# Patient Record
Sex: MALE | Race: WHITE | Employment: FULL TIME | URBAN - METROPOLITAN AREA
[De-identification: names, ages, dates, MRNs, and addresses within clinical notes are randomized per-mention and may not be internally consistent; named-entity substitution may affect disease eponyms.]

---

## 2023-07-10 ENCOUNTER — OFFICE VISIT (OUTPATIENT)
Dept: ORTHOPEDICS CLINIC | Facility: CLINIC | Age: 63
End: 2023-07-10
Payer: COMMERCIAL

## 2023-07-10 ENCOUNTER — HOSPITAL ENCOUNTER (OUTPATIENT)
Dept: GENERAL RADIOLOGY | Age: 63
Discharge: HOME | End: 2023-07-10
Attending: ORTHOPAEDIC SURGERY
Payer: COMMERCIAL

## 2023-07-10 VITALS — WEIGHT: 279 LBS | HEIGHT: 75 IN | BODY MASS INDEX: 34.69 KG/M2

## 2023-07-10 DIAGNOSIS — Z01.89 ENCOUNTER FOR LOWER EXTREMITY COMPARISON IMAGING STUDY: ICD-10-CM

## 2023-07-10 DIAGNOSIS — M17.12 PRIMARY OSTEOARTHRITIS OF LEFT KNEE: ICD-10-CM

## 2023-07-10 DIAGNOSIS — M25.562 LEFT KNEE PAIN, UNSPECIFIED CHRONICITY: ICD-10-CM

## 2023-07-10 DIAGNOSIS — M25.462 EFFUSION OF LEFT KNEE: ICD-10-CM

## 2023-07-10 DIAGNOSIS — M25.562 LEFT KNEE PAIN, UNSPECIFIED CHRONICITY: Primary | ICD-10-CM

## 2023-07-10 DIAGNOSIS — M17.12 OSTEOARTHRITIS OF LEFT KNEE, UNSPECIFIED OSTEOARTHRITIS TYPE: ICD-10-CM

## 2023-07-10 PROCEDURE — 73562 X-RAY EXAM OF KNEE 3: CPT | Performed by: ORTHOPAEDIC SURGERY

## 2023-07-10 PROCEDURE — 73564 X-RAY EXAM KNEE 4 OR MORE: CPT | Performed by: ORTHOPAEDIC SURGERY

## 2023-07-10 RX ORDER — TRIAMCINOLONE ACETONIDE 40 MG/ML
40 INJECTION, SUSPENSION INTRA-ARTICULAR; INTRAMUSCULAR ONCE
Status: COMPLETED | OUTPATIENT
Start: 2023-07-10 | End: 2023-07-10

## 2023-07-10 RX ORDER — LISINOPRIL 10 MG/1
10 TABLET ORAL DAILY
COMMUNITY
Start: 2023-06-20

## 2023-07-10 RX ORDER — ROSUVASTATIN CALCIUM 5 MG/1
5 TABLET, COATED ORAL DAILY
COMMUNITY
Start: 2023-05-03

## 2023-07-10 RX ORDER — ASPIRIN 81 MG/1
81 TABLET ORAL DAILY
COMMUNITY

## 2023-07-10 RX ORDER — COVID-19 ANTIGEN TEST
220 KIT MISCELLANEOUS
COMMUNITY

## 2023-07-10 RX ADMIN — TRIAMCINOLONE ACETONIDE 40 MG: 40 INJECTION, SUSPENSION INTRA-ARTICULAR; INTRAMUSCULAR at 11:50:00

## 2023-08-17 ENCOUNTER — OFFICE VISIT (OUTPATIENT)
Dept: ORTHOPEDICS CLINIC | Facility: CLINIC | Age: 63
End: 2023-08-17
Payer: COMMERCIAL

## 2023-08-17 VITALS — HEIGHT: 75 IN | BODY MASS INDEX: 34.69 KG/M2 | WEIGHT: 279 LBS

## 2023-08-17 DIAGNOSIS — M11.00 CHONDROCALCINOSIS DUE TO CALCIUM HYDROXYAPATITE CRYSTALS: ICD-10-CM

## 2023-08-17 DIAGNOSIS — M25.562 ACUTE PAIN OF LEFT KNEE: ICD-10-CM

## 2023-08-17 DIAGNOSIS — M25.462 EFFUSION OF LEFT KNEE: Primary | ICD-10-CM

## 2023-08-17 PROCEDURE — 3008F BODY MASS INDEX DOCD: CPT | Performed by: ORTHOPAEDIC SURGERY

## 2023-08-17 PROCEDURE — 99214 OFFICE O/P EST MOD 30 MIN: CPT | Performed by: ORTHOPAEDIC SURGERY

## 2023-09-01 ENCOUNTER — HOSPITAL ENCOUNTER (OUTPATIENT)
Dept: MRI IMAGING | Facility: HOSPITAL | Age: 63
Discharge: HOME | End: 2023-09-01
Attending: ORTHOPAEDIC SURGERY
Payer: COMMERCIAL

## 2023-09-01 DIAGNOSIS — M11.00 CHONDROCALCINOSIS DUE TO CALCIUM HYDROXYAPATITE CRYSTALS: ICD-10-CM

## 2023-09-01 DIAGNOSIS — M25.562 ACUTE PAIN OF LEFT KNEE: ICD-10-CM

## 2023-09-01 DIAGNOSIS — M25.462 EFFUSION OF LEFT KNEE: ICD-10-CM

## 2023-09-01 PROCEDURE — 73721 MRI JNT OF LWR EXTRE W/O DYE: CPT | Performed by: ORTHOPAEDIC SURGERY

## 2023-09-07 ENCOUNTER — OFFICE VISIT (OUTPATIENT)
Dept: ORTHOPEDICS CLINIC | Facility: CLINIC | Age: 63
End: 2023-09-07
Payer: COMMERCIAL

## 2023-09-07 VITALS — BODY MASS INDEX: 34.69 KG/M2 | WEIGHT: 279 LBS | HEIGHT: 75 IN

## 2023-09-07 DIAGNOSIS — M11.20 CALCIUM PYROPHOSPHATE DEPOSITION DISEASE (CPPD): ICD-10-CM

## 2023-09-07 DIAGNOSIS — M25.462 EFFUSION OF LEFT KNEE: ICD-10-CM

## 2023-09-07 DIAGNOSIS — S83.232D COMPLEX TEAR OF MEDIAL MENISCUS OF LEFT KNEE AS CURRENT INJURY, SUBSEQUENT ENCOUNTER: Primary | ICD-10-CM

## 2023-09-07 DIAGNOSIS — M17.12 PRIMARY OSTEOARTHRITIS OF LEFT KNEE: ICD-10-CM

## 2023-09-07 PROCEDURE — 99215 OFFICE O/P EST HI 40 MIN: CPT | Performed by: ORTHOPAEDIC SURGERY

## 2023-09-07 PROCEDURE — 3008F BODY MASS INDEX DOCD: CPT | Performed by: ORTHOPAEDIC SURGERY

## 2023-09-18 ENCOUNTER — TELEPHONE (OUTPATIENT)
Dept: ORTHOPEDICS CLINIC | Facility: CLINIC | Age: 63
End: 2023-09-18

## 2023-09-18 DIAGNOSIS — M11.20 CALCIUM PYROPHOSPHATE DEPOSITION DISEASE (CPPD): ICD-10-CM

## 2023-09-18 DIAGNOSIS — M25.462 EFFUSION OF LEFT KNEE: ICD-10-CM

## 2023-09-18 DIAGNOSIS — M17.12 PRIMARY OSTEOARTHRITIS OF LEFT KNEE: Primary | ICD-10-CM

## 2023-09-19 NOTE — TELEPHONE ENCOUNTER
SURGERY SCHEDULING SHEET    Alisa Rios  6/3/1960  [id number]    Procedure: Left Knee Arthroscopy, Partial Medial Meniscectomy (45738), and Chondroplasty (32184)    Diagnosis:  Complex tear of medial meniscus of left knee as current injury, subsequent encounter S83.232D    Primary osteoarthritis of left knee M17.12    Effusion of left knee M25.462    Calcium pyrophosphate deposition disease (CPPD) M11.20    Anesthesia: General    Length of Surgery: 1 hr    Disposition: Outpatient    Special Equipment: NONE    Positioning:    Assist: Yes SERA ARAUJO    Pre-op Testing: PER ANESTHESIA GUIDELINES    Clearance: HISTORY AND PHYSICAL     Post op: 7-10 days post op    Callie Ramirez MD  [address] Orthopedic Surgery  Phone: [phone]  Fax: [phone]

## 2023-09-26 PROBLEM — D12.8 BENIGN NEOPLASM OF RECTUM AND ANAL CANAL: Status: ACTIVE | Noted: 2023-09-26

## 2023-09-26 PROBLEM — Z12.11 SPECIAL SCREENING FOR MALIGNANT NEOPLASM OF COLON: Status: ACTIVE | Noted: 2023-09-26

## 2023-09-26 PROBLEM — D12.5 BENIGN NEOPLASM OF SIGMOID COLON: Status: ACTIVE | Noted: 2023-09-26

## 2023-09-26 PROBLEM — D12.9 BENIGN NEOPLASM OF RECTUM AND ANAL CANAL: Status: ACTIVE | Noted: 2023-09-26

## 2023-10-17 ENCOUNTER — HOSPITAL ENCOUNTER (OUTPATIENT)
Facility: HOSPITAL | Age: 63
Setting detail: HOSPITAL OUTPATIENT SURGERY
Discharge: HOME | End: 2023-10-17
Attending: ORTHOPAEDIC SURGERY | Admitting: ORTHOPAEDIC SURGERY
Payer: COMMERCIAL

## 2023-10-17 ENCOUNTER — ANESTHESIA EVENT (OUTPATIENT)
Dept: SURGERY | Facility: HOSPITAL | Age: 63
End: 2023-10-17
Payer: COMMERCIAL

## 2023-10-17 ENCOUNTER — ANESTHESIA (OUTPATIENT)
Dept: SURGERY | Facility: HOSPITAL | Age: 63
End: 2023-10-17
Payer: COMMERCIAL

## 2023-10-17 VITALS
HEART RATE: 77 BPM | OXYGEN SATURATION: 95 % | WEIGHT: 275 LBS | HEIGHT: 74.75 IN | DIASTOLIC BLOOD PRESSURE: 92 MMHG | TEMPERATURE: 96 F | RESPIRATION RATE: 20 BRPM | SYSTOLIC BLOOD PRESSURE: 161 MMHG | BODY MASS INDEX: 34.55 KG/M2

## 2023-10-17 DIAGNOSIS — Z98.890 STATUS POST ARTHROSCOPY OF LEFT KNEE: ICD-10-CM

## 2023-10-17 DIAGNOSIS — Z01.818 PRE-OP TESTING: Primary | ICD-10-CM

## 2023-10-17 PROCEDURE — 0SBD4ZZ EXCISION OF LEFT KNEE JOINT, PERCUTANEOUS ENDOSCOPIC APPROACH: ICD-10-PCS | Performed by: ORTHOPAEDIC SURGERY

## 2023-10-17 RX ORDER — SODIUM CHLORIDE, SODIUM LACTATE, POTASSIUM CHLORIDE, CALCIUM CHLORIDE 600; 310; 30; 20 MG/100ML; MG/100ML; MG/100ML; MG/100ML
INJECTION, SOLUTION INTRAVENOUS CONTINUOUS
Status: DISCONTINUED | OUTPATIENT
Start: 2023-10-17 | End: 2023-10-17

## 2023-10-17 RX ORDER — MEPERIDINE HYDROCHLORIDE 25 MG/ML
12.5 INJECTION INTRAMUSCULAR; INTRAVENOUS; SUBCUTANEOUS AS NEEDED
Status: DISCONTINUED | OUTPATIENT
Start: 2023-10-17 | End: 2023-10-17

## 2023-10-17 RX ORDER — ACETAMINOPHEN 500 MG
1000 TABLET ORAL ONCE AS NEEDED
Status: COMPLETED | OUTPATIENT
Start: 2023-10-17 | End: 2023-10-17

## 2023-10-17 RX ORDER — MIDAZOLAM HYDROCHLORIDE 1 MG/ML
1 INJECTION INTRAMUSCULAR; INTRAVENOUS EVERY 5 MIN PRN
Status: DISCONTINUED | OUTPATIENT
Start: 2023-10-17 | End: 2023-10-17

## 2023-10-17 RX ORDER — ACETAMINOPHEN AND CODEINE PHOSPHATE 300; 30 MG/1; MG/1
1 TABLET ORAL EVERY 6 HOURS PRN
Qty: 20 TABLET | Refills: 0 | Status: SHIPPED | OUTPATIENT
Start: 2023-10-17

## 2023-10-17 RX ORDER — HYDROCODONE BITARTRATE AND ACETAMINOPHEN 5; 325 MG/1; MG/1
2 TABLET ORAL ONCE AS NEEDED
Status: COMPLETED | OUTPATIENT
Start: 2023-10-17 | End: 2023-10-17

## 2023-10-17 RX ORDER — NALOXONE HYDROCHLORIDE 0.4 MG/ML
0.08 INJECTION, SOLUTION INTRAMUSCULAR; INTRAVENOUS; SUBCUTANEOUS AS NEEDED
Status: DISCONTINUED | OUTPATIENT
Start: 2023-10-17 | End: 2023-10-17

## 2023-10-17 RX ORDER — PROCHLORPERAZINE EDISYLATE 5 MG/ML
5 INJECTION INTRAMUSCULAR; INTRAVENOUS EVERY 8 HOURS PRN
Status: DISCONTINUED | OUTPATIENT
Start: 2023-10-17 | End: 2023-10-17

## 2023-10-17 RX ORDER — LIDOCAINE HYDROCHLORIDE 10 MG/ML
INJECTION, SOLUTION INFILTRATION; PERINEURAL AS NEEDED
Status: DISCONTINUED | OUTPATIENT
Start: 2023-10-17 | End: 2023-10-17 | Stop reason: HOSPADM

## 2023-10-17 RX ORDER — BUPIVACAINE HYDROCHLORIDE AND EPINEPHRINE 2.5; 5 MG/ML; UG/ML
INJECTION, SOLUTION EPIDURAL; INFILTRATION; INTRACAUDAL; PERINEURAL AS NEEDED
Status: DISCONTINUED | OUTPATIENT
Start: 2023-10-17 | End: 2023-10-17 | Stop reason: HOSPADM

## 2023-10-17 RX ORDER — CEFAZOLIN SODIUM IN 0.9 % NACL 3 G/100 ML
INTRAVENOUS SOLUTION, PIGGYBACK (ML) INTRAVENOUS
Status: DISCONTINUED
Start: 2023-10-17 | End: 2023-10-17

## 2023-10-17 RX ORDER — ONDANSETRON 2 MG/ML
4 INJECTION INTRAMUSCULAR; INTRAVENOUS EVERY 6 HOURS PRN
Status: DISCONTINUED | OUTPATIENT
Start: 2023-10-17 | End: 2023-10-17

## 2023-10-17 RX ORDER — DEXAMETHASONE SODIUM PHOSPHATE 4 MG/ML
VIAL (ML) INJECTION AS NEEDED
Status: DISCONTINUED | OUTPATIENT
Start: 2023-10-17 | End: 2023-10-17 | Stop reason: SURG

## 2023-10-17 RX ORDER — DIPHENHYDRAMINE HYDROCHLORIDE 50 MG/ML
12.5 INJECTION INTRAMUSCULAR; INTRAVENOUS AS NEEDED
Status: DISCONTINUED | OUTPATIENT
Start: 2023-10-17 | End: 2023-10-17

## 2023-10-17 RX ORDER — HYDROCODONE BITARTRATE AND ACETAMINOPHEN 5; 325 MG/1; MG/1
1 TABLET ORAL ONCE AS NEEDED
Status: COMPLETED | OUTPATIENT
Start: 2023-10-17 | End: 2023-10-17

## 2023-10-17 RX ORDER — HYDROMORPHONE HYDROCHLORIDE 1 MG/ML
0.4 INJECTION, SOLUTION INTRAMUSCULAR; INTRAVENOUS; SUBCUTANEOUS EVERY 5 MIN PRN
Status: DISCONTINUED | OUTPATIENT
Start: 2023-10-17 | End: 2023-10-17

## 2023-10-17 RX ORDER — EPHEDRINE SULFATE 50 MG/ML
INJECTION INTRAVENOUS AS NEEDED
Status: DISCONTINUED | OUTPATIENT
Start: 2023-10-17 | End: 2023-10-17 | Stop reason: SURG

## 2023-10-17 RX ORDER — MORPHINE SULFATE 2 MG/ML
INJECTION, SOLUTION INTRAMUSCULAR; INTRAVENOUS AS NEEDED
Status: DISCONTINUED | OUTPATIENT
Start: 2023-10-17 | End: 2023-10-17 | Stop reason: HOSPADM

## 2023-10-17 RX ORDER — CEFAZOLIN SODIUM IN 0.9 % NACL 3 G/100 ML
3 INTRAVENOUS SOLUTION, PIGGYBACK (ML) INTRAVENOUS ONCE
Status: COMPLETED | OUTPATIENT
Start: 2023-10-17 | End: 2023-10-17

## 2023-10-17 RX ORDER — SCOLOPAMINE TRANSDERMAL SYSTEM 1 MG/1
1 PATCH, EXTENDED RELEASE TRANSDERMAL ONCE
Status: DISCONTINUED | OUTPATIENT
Start: 2023-10-17 | End: 2023-10-17 | Stop reason: HOSPADM

## 2023-10-17 RX ORDER — MIDAZOLAM HYDROCHLORIDE 1 MG/ML
INJECTION INTRAMUSCULAR; INTRAVENOUS AS NEEDED
Status: DISCONTINUED | OUTPATIENT
Start: 2023-10-17 | End: 2023-10-17 | Stop reason: SURG

## 2023-10-17 RX ORDER — LIDOCAINE HYDROCHLORIDE 10 MG/ML
INJECTION, SOLUTION EPIDURAL; INFILTRATION; INTRACAUDAL; PERINEURAL AS NEEDED
Status: DISCONTINUED | OUTPATIENT
Start: 2023-10-17 | End: 2023-10-17 | Stop reason: SURG

## 2023-10-17 RX ORDER — HYDROMORPHONE HYDROCHLORIDE 1 MG/ML
0.6 INJECTION, SOLUTION INTRAMUSCULAR; INTRAVENOUS; SUBCUTANEOUS EVERY 5 MIN PRN
Status: DISCONTINUED | OUTPATIENT
Start: 2023-10-17 | End: 2023-10-17

## 2023-10-17 RX ORDER — HYDROMORPHONE HYDROCHLORIDE 1 MG/ML
0.2 INJECTION, SOLUTION INTRAMUSCULAR; INTRAVENOUS; SUBCUTANEOUS EVERY 5 MIN PRN
Status: DISCONTINUED | OUTPATIENT
Start: 2023-10-17 | End: 2023-10-17

## 2023-10-17 RX ORDER — ONDANSETRON 2 MG/ML
INJECTION INTRAMUSCULAR; INTRAVENOUS AS NEEDED
Status: DISCONTINUED | OUTPATIENT
Start: 2023-10-17 | End: 2023-10-17 | Stop reason: SURG

## 2023-10-17 RX ORDER — ACETAMINOPHEN 500 MG
1000 TABLET ORAL ONCE
Status: DISCONTINUED | OUTPATIENT
Start: 2023-10-17 | End: 2023-10-17 | Stop reason: HOSPADM

## 2023-10-17 RX ADMIN — SODIUM CHLORIDE, SODIUM LACTATE, POTASSIUM CHLORIDE, CALCIUM CHLORIDE: 600; 310; 30; 20 INJECTION, SOLUTION INTRAVENOUS at 14:05:00

## 2023-10-17 RX ADMIN — CEFAZOLIN SODIUM IN 0.9 % NACL 3 G: 3 G/100 ML INTRAVENOUS SOLUTION, PIGGYBACK (ML) INTRAVENOUS at 13:05:00

## 2023-10-17 RX ADMIN — MIDAZOLAM HYDROCHLORIDE 2 MG: 1 INJECTION INTRAMUSCULAR; INTRAVENOUS at 12:59:00

## 2023-10-17 RX ADMIN — EPHEDRINE SULFATE 5 MG: 50 INJECTION INTRAVENOUS at 13:23:00

## 2023-10-17 RX ADMIN — SODIUM CHLORIDE, SODIUM LACTATE, POTASSIUM CHLORIDE, CALCIUM CHLORIDE: 600; 310; 30; 20 INJECTION, SOLUTION INTRAVENOUS at 12:59:00

## 2023-10-17 RX ADMIN — DEXAMETHASONE SODIUM PHOSPHATE 8 MG: 4 MG/ML VIAL (ML) INJECTION at 13:11:00

## 2023-10-17 RX ADMIN — LIDOCAINE HYDROCHLORIDE 50 MG: 10 INJECTION, SOLUTION EPIDURAL; INFILTRATION; INTRACAUDAL; PERINEURAL at 13:03:00

## 2023-10-17 RX ADMIN — ONDANSETRON 4 MG: 2 INJECTION INTRAMUSCULAR; INTRAVENOUS at 13:54:00

## 2023-10-17 NOTE — OPERATIVE REPORT
[address] OPERATIVE NOTE    PATIENT'S NAME: Nader Simms   ATTENDING PHYSICIAN: Radha Howard MD   OPERATING PHYSICIAN: John Rosario MD   [facility]#:   [id number]  MEDICAL RECORD #:   [id number]    YOB: 1960  ADMISSION DATE:       10/17/2023     OPERATION DATE:  10/17/2023    Preoperative Diagnosis: Primary osteoarthritis of left knee [M17.12]  Effusion of left knee [M25.462]  Calcium pyrophosphate deposition disease (CPPD) [M11.20]  Medial meniscus tear    Postoperative Diagnosis: Primary osteoarthritis of left knee [M17.12]  Effusion of left knee [M25.462]  Calcium pyrophosphate deposition disease (CPPD) [M11.20]  Medial meniscus tear, complex  Lateral meniscus tear, complex  Synovitis    Procedures Performed: LEFT KNEE ARTHROSCOPY, PARTIAL MEDIAL MENISCECTOMY, PARTIAL LATERAL MENISCECTOMY, PARTIAL SYNOVECTOMY, AND CHONDROPLASTY. Primary Surgeon: John Rosario MD     Assistant: PA: Scott Cristobal PA-C; Nohemi Pettit [employer] student    Surgical Findings: See post-op    Anesthesia: General     Complications: none    Specimen: none    Drains: none    Condition: stable    Estimated Blood Loss: 10 ml    Tourniquet Time:  less than one hour     INDICATIONS:  The patient is a 58year old male who was seen in our office for persistent and progressive left knee pain and swelling. The patient describes no injury, gradual onset and progressive swelling. Plain films demonstrated no late degenerative changes. Symptoms did not improve despite initial conservative care. MRI was therefore obtained, which identified a complex medial meniscus tear. Treatment options were reviewed including continued non-operative care versus arthroscopic surgery. Given continued poor function and pain with activities, the patient elected to proceed with arthroscopy.   Risk, benefits and alternatives to surgery were discussed including but not limited to possible infection, bleeding, neurovascular injury as well as postop stiffness, continued pain and failed improvement following surgery. Risks of anesthesia were also reviewed including but not limited to possible cardiac, pulmonary, or cerebrovascular complications, any of which could be serious or life-threatening. Given the patients good health, the risk of the serious complications is felt to be low but not zero. The patient expressed understanding and elected to proceed. Pre-operative medical clearance for anesthesia was obtained through the patient's primary care physicians office, and informed consent was obtained. OPERATIVE TECHNIQUE:  The patient was identified in the preop holding area where the left lower extremity was initialed and informed consent was confirmed. The patient was taken to the operating room and placed supine on the operating table by anesthesia. After successful induction of general anesthesia, the patient was placed in a well-padded proximal thigh tourniquet pre-set to 250 mmHg. The operative leg was stabilized in an Acufex leg naik. The contralateral leg was placed in a sequential compression device at the calf and stabilized in a lower leg cradle in the felipa-lithotomy position with foam padding. The surgical leg was then prepped and draped in the usual sterile fashion. After confirming consent, side, and prophylactic antibiotics with an appropriate time-out, the operative lower extremity was exsanguinated with an Esmarch and the tourniquet was elevated. Standard diagnostic arthroscopy was initiated through a horizontal stab incision at the anterolateral portal site. Semi-blunt entry into the patellofemoral pouch revealed global grade 3 chondromalacia at the patella. The trochlea demonstrated similar global chondromalacia. Arthroscope was swept into the medial gutter where no loose bodies or osteophytes were seen. The medial compartment was inspected, and there was complex tearing of the medial meniscus.   A spinal needle was used to localize the medial working portal, followed by probing which confirmed the meniscus tear, global grade 3 chondromalacia and chondrocalcinosis scattered within the torn meniscal tissue and cartilage. Partial medial meniscectomy was therefore carried out along with gentle chondroplasty of the medial femoral condyle and tibial plateau. This included the combined use of duckbill biting instruments, followed by a 4-0 aggressive synovial resector until a stable margin was achieved at the midbody and posterior horn. Approximately 40% of the midbody and 70% of the posterior horn required resection until stable margins were achieved. Anterior horn was intact. Inspection of the notch revealed a normal-appearing and palpably intact ACL and PCL with no evidence of tear. Lateral compartment was meticulously probed which revealed inner margin tearing of lateral meniscus. Articular cartilage of the lateral femoral condyle and tibial plateau revealed mild grade 2 wear. Partial lateral meniscectomy was therefore carried out again using a combination of the 4-0 aggressive resector and duckbill biting instruments until a stable margin was achieved. Approximately 20% of the body and 30% of the posterior horn required resection until stable margins were achieved. A small flap was also removed anteriorly with associated chondrocalcinosis deposits. Chondroplasty of the distal femur and lateral tibial plateau was performed as needed with a shaver to a stable surface. Lateral gutter was free of loose bodies. At this point, the arthroscope was redirected into the patellofemoral joint. Meticulous probing confirmed the chondromalacia noted above. The medial and lateral portals were used to perform a chondroplasty using a 4.0 shaver at the uneven surfaces of the patella and the trochlea until stable margins were achieved.   Partial synovectomy was also performed at the pouch and gutters where hypertrophic synovial inflammation was seen. All 3 compartments were copiously irrigated and evacuated of arthroscopic debris. An equal mixture of 1% Xylocaine and 0.25% Marcaine was mixed with 2 mg of Duramorph totalling about 30 ml, and this mixture was infused into the knee for postop pain control. Portals were reapproximated with 3-0 nylon, and a sterile nonadhesive dressing was applied using Betadine ointment, Adaptic, fluffs, ABDs and sterile Webril. A compressive 6-inch Ace was applied. Tourniquet was released at less than one hour elapsed time. The patient was awoken without complication and taken to the postanesthesia recovery room in stable condition. All sponge, needle, and instrument counts were correct. Dedicated assistance from [employer], was necessary for setup, leg and shaver management for joint access, wound closure and dressing application. Erica Carr MD, [place] [address] Medicine/Knee and Shoulder  [facility] Department of Orthopaedics  Phone [phone]  Fax [phone]  2:11 PM  10/17/23

## 2023-10-17 NOTE — ANESTHESIA PROCEDURE NOTES
Airway  Date/Time: 10/17/2023 1:04 PM  Urgency: elective    Airway not difficult    General Information and Staff    Patient location during procedure: OR  Anesthesiologist: Roque Shell DO  Resident/CRNA: Lilo Delcid CRNA  Performed: CRNA   Performed by: Lilo Delcid CRNA  Authorized by: Roque Shell DO      Indications and Patient Condition  Indications for airway management: anesthesia  Sedation level: deep  Preoxygenated: yes  Patient position: sniffing  Mask difficulty assessment: 1 - vent by mask    Final Airway Details  Final airway type: supraglottic airway      Successful airway: classic  Size 4       Number of attempts at approach: 1

## 2023-10-17 NOTE — H&P
Orthopaedic H&P Update    H&P performed by Alissa Rodríguez PA-C on 10/2/2023 was reviewed by Pratima Urbina MD on 10/17/2023, the patient was examined and no significant changes have occurred in the patient's condition since the H&P was performed. Risks and benefits were discussed, proceed with procedure as planned.       Pratima Urbina MD

## 2023-10-17 NOTE — DISCHARGE INSTRUCTIONS
Post-operative Knee Arthroscopy    Medication: Before you leave the hospital, you will be given a prescription for a pain reliever. This medication contains a narcotic with acetaminophen (Tylenol), so do not take any additional Tylenol. You may take Tylenol or Ibuprofen instead of the prescription as the pain subsides. If you take a daily Aspirin you may resume taking your Aspirin the evening of surgery. Day of Surgery: When you return home, elevate the extremity on some pillows, if applicable. Place an ice bag over the dressing for about 20 minutes three or four times a day for the first 5 days while protecting the skin with a sterile dressing plus Ace wrap (for knee). Dressings: The dressing should remain in place for 48 hours. After 48 hours, remove the bulky Ace wrap and the gauze dressings. Apply a small amount of Betadine ointment to sutures and cover with a breathable band-aid. Showering: Before showering, apply waterproof band-aids to sutures to ensure these areas do not get wet. Once finished, remove band-aids, let area air dry, and apply another small amount of Betadine ointment and regular band-aids. Do not immerse or soak the surgical wound (no baths). You may continue to use the Ace bandage if it makes you more comfortable. Activity: You may walk on the leg as tolerated, unless instructed differently. You may use crutches or a cane as needed to minimize discomfort. For knee arthroscopy: Flex and extend the knee, foot, and ankle to full range of motion as soon as possible to prevent stiffness. Follow up: You will be scheduled for a follow up office visit in 7-10 days to have your sutures taken out. Your plan for physical therapy, driving, and returning to work will be discussed at this appointment. Please don't hesitate to contact our office at [phone] if you have any post-operative questions or concerns.      Remember Geovanna's Top 3  Deep Breathing  Walking and Foot Pumps  Plenty of Water    It was a pleasure taking care of you.   Take Care  Geovanna RN

## 2023-10-25 ENCOUNTER — OFFICE VISIT (OUTPATIENT)
Dept: ORTHOPEDICS CLINIC | Facility: CLINIC | Age: 63
End: 2023-10-25

## 2023-10-25 VITALS — HEIGHT: 74.75 IN | BODY MASS INDEX: 34.55 KG/M2 | WEIGHT: 275 LBS

## 2023-10-25 DIAGNOSIS — Z98.890 STATUS POST ARTHROSCOPY OF LEFT KNEE: ICD-10-CM

## 2023-10-25 DIAGNOSIS — Z48.89 AFTERCARE FOLLOWING SURGERY: Primary | ICD-10-CM

## 2023-10-25 PROCEDURE — 99024 POSTOP FOLLOW-UP VISIT: CPT | Performed by: PHYSICIAN ASSISTANT

## 2023-10-25 PROCEDURE — 3008F BODY MASS INDEX DOCD: CPT | Performed by: PHYSICIAN ASSISTANT

## 2023-10-25 NOTE — PROGRESS NOTES
[facility] Post Op Progress Note    Date of Surgery: 10/17/2023      Subjective: Margret Barrwo is a 58year old male who is here for follow-up of his left knee. He underwent left knee arthroscopy with partial medial and lateral meniscectomy by Dr. Yomi Tompkins approximately 1 week ago. He states that he is doing well but has some persistent swelling. He has been using a crutch for ambulatory assistance. He has been working on range of motion exercises on his own at home. Objective: Exam of the left knee and lower extremity reveals the portals are clean, dry, and intact. Sutures were removed and Band-Aids were applied. He has full extension and flexion to 105 degrees. He does have moderate effusion. No calf tenderness upon palpation. Negative Homans' sign. Sensation is present to light touch. Assessment: Status post left knee arthroscopy with partial medial and lateral meniscectomy with partial synovectomy and chondroplasty      Plan: Arthroscopic pictures and surgical findings were discussed with the patient. He did have medial and lateral meniscus tear that was trimmed to stable edges as well as synovitis and chondrocalcinosis that was debrided and evacuated. Postop instructions were discussed including range of motion exercises. I did offer formal therapy but he declined for now and will like to try it on his own at home. He will continue to apply ice and elevate the leg with his persistent swelling. He will slowly advance with activities as tolerated. Wound care was discussed and he will avoid soaking or submersion of the wounds for an additional 2 to 3 weeks. I would like to see him back in 3 weeks for recheck or sooner with questions, concerns, or worsening symptoms. A student was present during the visit after the patient's consent.       Fran Christian, PEDRITO, PA-C Orthopedic Surgery   [facility]  [address]   t: [phone]  f: [phone]

## 2023-11-15 ENCOUNTER — OFFICE VISIT (OUTPATIENT)
Dept: ORTHOPEDICS CLINIC | Facility: CLINIC | Age: 63
End: 2023-11-15
Payer: COMMERCIAL

## 2023-11-15 VITALS — BODY MASS INDEX: 35.29 KG/M2 | HEIGHT: 74 IN | WEIGHT: 275 LBS

## 2023-11-15 DIAGNOSIS — M25.462 EFFUSION, LEFT KNEE: ICD-10-CM

## 2023-11-15 DIAGNOSIS — Z48.89 AFTERCARE FOLLOWING SURGERY: Primary | ICD-10-CM

## 2023-11-15 DIAGNOSIS — Z98.890 STATUS POST ARTHROSCOPY OF LEFT KNEE: ICD-10-CM

## 2023-11-15 PROCEDURE — 3008F BODY MASS INDEX DOCD: CPT | Performed by: PHYSICIAN ASSISTANT

## 2023-11-15 PROCEDURE — 99024 POSTOP FOLLOW-UP VISIT: CPT | Performed by: PHYSICIAN ASSISTANT

## 2023-11-15 PROCEDURE — 20610 DRAIN/INJ JOINT/BURSA W/O US: CPT | Performed by: PHYSICIAN ASSISTANT

## 2023-11-15 RX ORDER — TRIAMCINOLONE ACETONIDE 40 MG/ML
40 INJECTION, SUSPENSION INTRA-ARTICULAR; INTRAMUSCULAR ONCE
Status: COMPLETED | OUTPATIENT
Start: 2023-11-15 | End: 2023-11-15

## 2023-11-15 RX ADMIN — TRIAMCINOLONE ACETONIDE 40 MG: 40 INJECTION, SUSPENSION INTRA-ARTICULAR; INTRAMUSCULAR at 09:00:00

## 2023-11-15 NOTE — PROGRESS NOTES
[facility] Post Op Progress Note    Date of Surgery: 10/17/2023      Subjective: Ok Thornton is a 58year old male who is here for reevaluation of his left knee. He underwent left knee arthroscopy with partial medial lateral meniscectomy by Dr. Kimi Talavera approximately 1 month ago. He has noticed some persistent swelling and discomfort in the knee. He does get some sharp pains intermittently which do cause instability. He has been stretching on his own and limiting activity. He is here for further evaluation. Objective: Exam of the left knee and lower extremity reveals that the portals are well-healed. He lacks 2 to 3 degrees of full extension and flexion to 120 degrees. He does have a moderate to significant effusion. Sensation is present to light touch. Assessment: Left knee effusion status post arthroscopy with partial medial and lateral meniscectomy with partial synovectomy and chondroplasty      Plan: Arthroscopic pictures were discussed again. He did have areas of chondromalacia as well as synovitis that may be causing his persistent effusion. At this time, I did offer him an aspiration and cortisone injection to help further his progress postoperatively. He would like to proceed. He will continue stretching on his own. If in a week he is not regaining full motion after the aspiration, he will contact us for initiation of formal therapy. He will continue to limit activity and just do gentle stretching. He will follow-up with me in 1 month for recheck or sooner with questions, concerns, or worsening symptoms. Procedure: Risk, benefits, and alternatives to the aspiration and cortisone injection including but not limited to risk of needle infection, flareup, and failure to improve was discussed. He would like to proceed under meticulous sterile technique, 4 cc Xylocaine was used to anesthetize the lateral patellofemoral joint of the left knee.   Then through an 18-gauge needle, 70 cc of deann serosanguineous fluid was aspirated. Through the same 18-gauge needle, 4 cc of Xylocaine and 40 mg of Kenalog was injected with free flow fluid. A Band-Aid was applied. An Ace wrap wrap was also applied. Advised to follow-up with any adverse reactions.         PEDRITO Woodson, PA-C Orthopedic Surgery   [facility]opaedic Surgery  [address]   t: [phone]  f: [phone]

## 2023-11-29 ENCOUNTER — TELEPHONE (OUTPATIENT)
Dept: ORTHOPEDICS CLINIC | Facility: CLINIC | Age: 63
End: 2023-11-29

## 2023-11-29 DIAGNOSIS — Z48.89 AFTERCARE FOLLOWING SURGERY: Primary | ICD-10-CM

## 2023-11-29 DIAGNOSIS — Z98.890 STATUS POST ARTHROSCOPY OF LEFT KNEE: ICD-10-CM

## 2023-11-29 NOTE — TELEPHONE ENCOUNTER
- I just want to make sure I am not doing anything to hurt it. - Patient has been sick since thanksgiving and \"everything has stiffened up\". - swelling noted to the backside of the knee (not as swollen at the LOV)  - Tightness to back of the knee. I cannot bend it as good as I could a week ago    Patient states he would rather hold off on official therapy as he states he schedule is crazy.  \" I have got to go to work or I wont have a job\"    11/15/23:  \"If in a week he is not regaining full motion after the aspiration, he will contact us for initiation of formal therapy\"    PT pending

## 2023-11-29 NOTE — TELEPHONE ENCOUNTER
Patient called stating Jessica Mai had told him to give us a call if he was not doing better. Patient stated he is not doing better. Please advise     11/15/23:   If in a week he is not regaining full motion after the aspiration, he will contact us for initiation of formal therapy

## 2023-12-20 ENCOUNTER — OFFICE VISIT (OUTPATIENT)
Dept: ORTHOPEDICS CLINIC | Facility: CLINIC | Age: 63
End: 2023-12-20
Payer: COMMERCIAL

## 2023-12-20 VITALS — BODY MASS INDEX: 35.29 KG/M2 | HEIGHT: 74 IN | WEIGHT: 275 LBS

## 2023-12-20 DIAGNOSIS — Z98.890 STATUS POST ARTHROSCOPY OF LEFT KNEE: Primary | ICD-10-CM

## 2023-12-20 DIAGNOSIS — M17.12 PRIMARY OSTEOARTHRITIS OF LEFT KNEE: ICD-10-CM

## 2023-12-20 DIAGNOSIS — M25.462 EFFUSION, LEFT KNEE: ICD-10-CM

## 2023-12-20 PROCEDURE — 3008F BODY MASS INDEX DOCD: CPT | Performed by: PHYSICIAN ASSISTANT

## 2023-12-20 PROCEDURE — 20610 DRAIN/INJ JOINT/BURSA W/O US: CPT | Performed by: PHYSICIAN ASSISTANT

## 2023-12-20 PROCEDURE — 99024 POSTOP FOLLOW-UP VISIT: CPT | Performed by: PHYSICIAN ASSISTANT

## 2023-12-20 NOTE — PROGRESS NOTES
[facility] Post Op Progress Note    Date of Surgery: 10/17/2023       Subjective: Lazaro Ahumada is a 58year old male who is here for follow-up of his left knee. He underwent left knee arthroscopy with partial medial and lateral meniscectomy by Dr. Swapna Powell approximately 2 months ago. I did see him last month at which time an aspiration and cortisone injection was administered to the knee. He states that this provided relief but swelling has recurred. Overall his range of motion is much better and feels like it is normalizing. He does note episodes of instability where the knee carissa and has pain anteriorly. He has been icing multiple times a day, elevation and using a compressive brace. Objective: Exam of the left knee and lower extremity reveals that the portals are well-healed. He has near full extension and flexion to 130 degrees. He does have a moderate effusion. Sensation is present to light touch. No significant medial or lateral joint line tenderness. Sensation is present to light touch. Assessment: Recurrent effusion status post left knee arthroscopy with partial medial and lateral meniscectomy and chondroplasty      Plan: Unfortunately he does have a recurrence of his effusion despite normalizing range of motion. I explained that this can cause a buckling sensation and I recommend going ahead with an aspiration today. He will continue with his diligent routine of icing, elevation, compression and will try oral anti-inflammatories if swelling starts to recur. He would like to proceed with the aspiration today. He will follow-up with Dr. Swapna Powell in 1 month with persistent pain or recurrence of symptoms. I did explain that he did have mild degenerative changes and chondromalacia that may be causing persistent swelling especially if he is doing too much activity. He understands and will follow-up as mentioned.     Procedure: Risk, benefits, and alternatives to the aspiration including but not limited to risk of needle infection, flareup, and failure to improve was discussed. He would like to proceed under meticulous sterile technique, 4 cc Xylocaine was used to anesthetize the lateral patellofemoral joint of the left knee. Then through an 18-gauge needle, 45cc of deann serosanguineous fluid was aspirated. A Band-Aid was applied and he will apply his compressive brace at home. He will follow-up with any adverse reactions or worsening symptoms in the interim.       PEDRITO Ge, PA-C Orthopedic Surgery   [facility]opaedic Surgery  [address]   t: [phone]  f: [phone]

## 2024-01-24 ENCOUNTER — OFFICE VISIT (OUTPATIENT)
Dept: ORTHOPEDICS CLINIC | Facility: CLINIC | Age: 64
End: 2024-01-24
Payer: COMMERCIAL

## 2024-01-24 VITALS — HEIGHT: 74 IN | BODY MASS INDEX: 35.29 KG/M2 | WEIGHT: 275 LBS

## 2024-01-24 DIAGNOSIS — Z98.890 STATUS POST ARTHROSCOPY OF LEFT KNEE: ICD-10-CM

## 2024-01-24 DIAGNOSIS — M25.462 EFFUSION OF LEFT KNEE: Primary | ICD-10-CM

## 2024-01-24 DIAGNOSIS — M17.12 PRIMARY OSTEOARTHRITIS OF LEFT KNEE: ICD-10-CM

## 2024-01-24 PROCEDURE — 3008F BODY MASS INDEX DOCD: CPT | Performed by: ORTHOPAEDIC SURGERY

## 2024-01-24 PROCEDURE — 99213 OFFICE O/P EST LOW 20 MIN: CPT | Performed by: ORTHOPAEDIC SURGERY

## 2024-01-24 NOTE — PROGRESS NOTES
[facility] Note    Chief Complaint   Patient presents with    Follow - Up     LT KNEE SCOPE PMM; SX: 10/17/23     HPI: The patient is a 63 year old male who presents about 3 months s/p arthroscopy to the left knee.  Although he is improved with regard to the medial aspect of this knee, there is still stiffness, swelling and intermittent discomfort anteriorly.  Significant patellofemoral arthritic changes were seen at the time of surgery and he has responded to some degree to postoperative aspiration of his knee effusions.  The most recent aspiration was provided about a month ago by Rebecca.    Past Medical History:   Diagnosis Date    Atypical mole     Calculus of kidney     Hearing loss     Hemorrhoids     High blood pressure     KIDNEY STONE     Visual impairment     Wears glasses      Past Surgical History:   Procedure Laterality Date    FRAGMENTING OF KIDNEY STONE  01/05/2010    Performed by LEXI MONTANEZ at [facility] [place]    OTHER SURGICAL HISTORY  01/05/2010    R [facility] Cysto- Dr. Montanez    OTHER SURGICAL HISTORY Left 10/17/2023    Left knee scope     Current Outpatient Medications   Medication Sig Dispense Refill    Multiple Vitamin (MULTI-VITAMIN DAILY) Oral Tab Take by mouth.      EQL GLUCOSAMINE CHONDROITIN OR Take 1,200 mg by mouth daily.      lisinopril 10 MG Oral Tab Take 1 tablet (10 mg total) by mouth daily.      rosuvastatin 5 MG Oral Tab Take 1 tablet (5 mg total) by mouth daily.      aspirin 81 MG Oral Tab EC Take 1 tablet (81 mg total) by mouth daily.      NORVASC 10 MG OR TABS Take by mouth.       No Known Allergies  Family History   Problem Relation Age of Onset    Hypertension Mother      Social History     Occupational History    Not on file   Tobacco Use    Smoking status: Never    Smokeless tobacco: Never   Vaping Use    Vaping Use: Never used   Substance and Sexual Activity    Alcohol use: Yes     Alcohol/week: 4.0 standard drinks of alcohol     Types: 2 Glasses of wine, 2  Standard drinks or equivalent per week    Drug use: Never    Sexual activity: Not on file        ROS:  Complete ROS reviewed by me and non-contributory to the chief complaint except as mentioned above.    Physical Exam:    Ht 6' 2\" (1.88 m)   Wt 275 lb (124.7 kg)   BMI 35.31 kg/m²   Constitutional: Well developed, well nourished 63 year old male  Psychological: NAD, alert and appropriate  Respiratory: Breathing comfortably on room air with RR of 10-14  Cardiac: Palpable distal pulses with pink warm extremities  Left knee reveals well-healed portals with 1+ effusion.  He can fully extend and flex to 120 degrees.  Medial joint line is trace tender more so than lateral with a negative Joyce's.  Popliteal space is benign.  Neurovascular status is intact on sensory, motor and perfusion assessment distally.    Assessment/Diagnoses:  Diagnoses and all orders for this visit:    Effusion of left knee    Status post arthroscopy of left knee    Primary osteoarthritis of left knee      Plan:  I reviewed imaging and exam findings with the patient.  Most of his symptoms are localized to the anterior aspect of this left knee at the patellofemoral articulation with fairly high-grade arthritic changes were noted at arthroscopy.  He continues to struggle with mild stiffness from effusions as well.  We had a long discussion regarding the diagnosis, treatment options and prognosis.  In light of his multiple aspirations following surgery I would recommend we hold off on any needle procedures for today.  Weight loss, activity protection, light compression and icing will continue to be beneficial.  Oral and topical modalities to reduce inflammation were also reviewed and recommended.  If symptoms are not gradually improving with continued conservative care reassessment is advised.  Questions were answered and he verbalized understanding and appreciation.      Erica Coto MD  [facility] [facility]      This document was  partially prepared using Dragon [employer] voice recognition software.

## 2024-03-13 ENCOUNTER — OFFICE VISIT (OUTPATIENT)
Dept: ORTHOPEDICS CLINIC | Facility: CLINIC | Age: 64
End: 2024-03-13
Payer: COMMERCIAL

## 2024-03-13 VITALS — HEIGHT: 74 IN | WEIGHT: 275 LBS | BODY MASS INDEX: 35.29 KG/M2

## 2024-03-13 DIAGNOSIS — Z98.890 STATUS POST ARTHROSCOPY OF LEFT KNEE: ICD-10-CM

## 2024-03-13 DIAGNOSIS — M25.462 EFFUSION OF LEFT KNEE: Primary | ICD-10-CM

## 2024-03-13 DIAGNOSIS — M17.12 PRIMARY OSTEOARTHRITIS OF LEFT KNEE: ICD-10-CM

## 2024-03-13 PROCEDURE — 99214 OFFICE O/P EST MOD 30 MIN: CPT | Performed by: PHYSICIAN ASSISTANT

## 2024-03-13 PROCEDURE — 20610 DRAIN/INJ JOINT/BURSA W/O US: CPT | Performed by: PHYSICIAN ASSISTANT

## 2024-03-13 RX ORDER — TRIAMCINOLONE ACETONIDE 40 MG/ML
40 INJECTION, SUSPENSION INTRA-ARTICULAR; INTRAMUSCULAR ONCE
Status: COMPLETED | OUTPATIENT
Start: 2024-03-13 | End: 2024-03-13

## 2024-03-13 RX ADMIN — TRIAMCINOLONE ACETONIDE 40 MG: 40 INJECTION, SUSPENSION INTRA-ARTICULAR; INTRAMUSCULAR at 08:58:00

## 2024-03-13 NOTE — PROGRESS NOTES
[facility] Progress Note      Chief Complaint:  Left knee pain and swelling      Subjective: Magdy Simms is a 63 year old male who is here for follow-up of his left knee.  He underwent left knee arthroscopy with partial medial and lateral meniscectomy and chondroplasty by Dr. Coto approximately 6 months ago.  Unfortunately he did have some postoperative effusions that were treated.  He did have multiple aspirations and 1 cortisone injection.  His cortisone injection was approximately 3 months ago.  He has continued with conservative treatment at home including oral anti-inflammatories like Aleve, ice, elevation, compression wrapping, and activity modification and continues to have pain in the medial and anterior aspect of the knee as well as swelling that worsens throughout the day.  Pain is worse with longer walks as well as going downstairs.  He does note episodes of instability where the knee will buckle.  He is here to discuss next steps in treatment.      Objective: Exam of the left knee and lower extremity reveals that the portals are well-healed.  He has full extension and near full flexion.  He does have a mild to moderate effusion.  No tenderness to palpation over the lateral joint line.  He is tender to palpation over the posterior medial joint line.  Negative Steinmann and Joyce.  Sensation is present to light touch.  Stable ligamentous exam.      Assessment: Recurrent left knee effusion status post arthroscopy with degenerative joint disease      Plan: I reviewed his arthroscopic pictures did shows mild chondromalacia about the patellofemoral and medial compartments.  Unfortunately he does have persistent and recurrent effusions despite conservative treatment and repeat aspirations in the past.  I discussed treatment options including going ahead with a repeat aspiration and cortisone injection as it has been 3 months versus repeat imaging with an MRI scan.  He would like to try the  procedure first and if symptoms recur in the next 2 to 4 weeks, he will contact me for further imaging of the left knee including MRI scan.  He will continue to modify his activities to avoid high impact, and will continue with conservative treatment including ice, elevation, compression, and oral anti-inflammatories.  He will follow-up with me as discussed or sooner with questions or concerns.    Procedure: Risk, benefits, and alternatives to the aspiration and cortisone injection including but not limited to risk of needle infection, flareup, and failure to improve was discussed.  He would like to proceed under meticulous sterile technique, 4 cc Xylocaine was used to anesthetize the lateral patellofemoral joint of the left knee.  Then through an 18-gauge needle, 35cc of serosanguineous fluid was aspirated.  Through the same 18-gauge needle, 4 cc of Xylocaine and 40 mg of Kenalog was injected with free flow fluid.  A Band-Aid was applied.  An Ace wrap wrap was also applied.  Advised to follow-up with any adverse reactions.          Rebecca Tse PA-C  Orthopedic Surgery   [address] [place] [id number]   t: [phone]  f: [phone]

## 2024-04-29 ENCOUNTER — PATIENT MESSAGE (OUTPATIENT)
Dept: ORTHOPEDICS CLINIC | Facility: CLINIC | Age: 64
End: 2024-04-29

## 2024-04-29 DIAGNOSIS — M17.12 PRIMARY OSTEOARTHRITIS OF LEFT KNEE: Primary | ICD-10-CM

## 2024-04-29 DIAGNOSIS — M25.462 KNEE EFFUSION, LEFT: ICD-10-CM

## 2024-04-29 NOTE — TELEPHONE ENCOUNTER
From: Magdy Simms  To: Rebecca Tse  Sent: 4/29/2024 10:03 AM CDT  Subject: Knee swelling and pain    Hope you are doing well.  You said to message you if things didn't improve. While they are somewhat better, the swelling started up again within a week of my last visit. The mornings are usually better for the pain and some have been relatively pain free but by the afternoon it is most of the time. It is not consistent.  I am still icing 3 times a day, morning, evening, and night. I am still wearing the compression sleeve except when sleeping. And I am still elevating when I can.  What is the next step here?  Thank you.

## 2024-04-29 NOTE — TELEPHONE ENCOUNTER
PER LOV- 3/24/2024 Please advise on MRI? Pended for review~        Assessment: Recurrent left knee effusion status post arthroscopy with degenerative joint disease        Plan: I reviewed his arthroscopic pictures did shows mild chondromalacia about the patellofemoral and medial compartments.  Unfortunately he does have persistent and recurrent effusions despite conservative treatment and repeat aspirations in the past.  I discussed treatment options including going ahead with a repeat aspiration and cortisone injection as it has been 3 months versus repeat imaging with an MRI scan.  He would like to try the procedure first and if symptoms recur in the next 2 to 4 weeks, he will contact me for further imaging of the left knee including MRI scan.  He will continue to modify his activities to avoid high impact, and will continue with conservative treatment including ice, elevation, compression, and oral anti-inflammatories.  He will follow-up with me as discussed or sooner with questions or concerns.

## 2024-05-21 ENCOUNTER — HOSPITAL ENCOUNTER (OUTPATIENT)
Dept: MRI IMAGING | Facility: HOSPITAL | Age: 64
Discharge: HOME | End: 2024-05-21
Attending: ORTHOPAEDIC SURGERY

## 2024-05-21 DIAGNOSIS — M25.462 KNEE EFFUSION, LEFT: ICD-10-CM

## 2024-05-21 DIAGNOSIS — M17.12 PRIMARY OSTEOARTHRITIS OF LEFT KNEE: ICD-10-CM

## 2024-05-21 PROCEDURE — 73721 MRI JNT OF LWR EXTRE W/O DYE: CPT | Performed by: ORTHOPAEDIC SURGERY

## 2024-05-28 ENCOUNTER — PATIENT MESSAGE (OUTPATIENT)
Dept: ORTHOPEDICS CLINIC | Facility: CLINIC | Age: 64
End: 2024-05-28

## 2024-06-12 ENCOUNTER — OFFICE VISIT (OUTPATIENT)
Dept: ORTHOPEDICS CLINIC | Facility: CLINIC | Age: 64
End: 2024-06-12
Payer: COMMERCIAL

## 2024-06-12 VITALS — HEIGHT: 74 IN | WEIGHT: 275 LBS | BODY MASS INDEX: 35.29 KG/M2

## 2024-06-12 DIAGNOSIS — M17.12 PRIMARY OSTEOARTHRITIS OF LEFT KNEE: Primary | ICD-10-CM

## 2024-06-12 DIAGNOSIS — M25.462 EFFUSION OF LEFT KNEE: ICD-10-CM

## 2024-06-12 PROCEDURE — 99214 OFFICE O/P EST MOD 30 MIN: CPT | Performed by: ORTHOPAEDIC SURGERY

## 2024-06-13 NOTE — PROGRESS NOTES
[facility] Note    Chief Complaint   Patient presents with    Follow - Up     LT KNEE PAIN//MRI RESULTS     HPI: The patient is a 64 year old male who presents for assessment of his left knee approximately 9 months s/p arthroscopy to the left knee.  Although he is improved with regard to the medial aspect of this knee, he continues to struggle with recurrent stiffness, swelling and intermittent subjective instability.  Significant patellofemoral arthritic changes were seen at the time of surgery and he has responded to some degree to postoperative aspiration of his knee effusions.  The most recent aspiration was provided about 3 months ago in March by Rebecca.  Follow-up MRI was also obtained and is available for our review.    Past Medical History:    Atypical mole    Calculus of kidney    Hearing loss    Hemorrhoids    High blood pressure    KIDNEY STONE    Visual impairment    Wears glasses     Past Surgical History:   Procedure Laterality Date    Fragmenting of kidney stone  01/05/2010    Performed by LEXI MONTANEZ at [facility] [place], [facility]    Other surgical history  01/05/2010    R [facility] Cysto- Dr. Montanez    Other surgical history Left 10/17/2023    Left knee scope     Current Outpatient Medications   Medication Sig Dispense Refill    Multiple Vitamin (MULTI-VITAMIN DAILY) Oral Tab Take by mouth.      EQL GLUCOSAMINE CHONDROITIN OR Take 1,200 mg by mouth daily.      lisinopril 10 MG Oral Tab Take 1 tablet (10 mg total) by mouth daily.      rosuvastatin 5 MG Oral Tab Take 1 tablet (5 mg total) by mouth daily.      aspirin 81 MG Oral Tab EC Take 1 tablet (81 mg total) by mouth daily.      NORVASC 10 MG OR TABS Take by mouth.       No Known Allergies  Family History   Problem Relation Age of Onset    Hypertension Mother      Social History     Occupational History    Not on file   Tobacco Use    Smoking status: Never    Smokeless tobacco: Never   Vaping Use    Vaping status: Never Used   Substance and Sexual  Activity    Alcohol use: Yes     Alcohol/week: 4.0 standard drinks of alcohol     Types: 2 Glasses of wine, 2 Standard drinks or equivalent per week    Drug use: Never    Sexual activity: Not on file        ROS:  Complete ROS reviewed by me and non-contributory to the chief complaint except as mentioned above.    Physical Exam:    Ht 6' 2\" (1.88 m)   Wt 275 lb (124.7 kg)   BMI 35.31 kg/m²   Constitutional: Well developed, well nourished 64 year old male  Psychological: NAD, alert and appropriate  Respiratory: Breathing comfortably on room air with RR of 10-14  Cardiac: Palpable distal pulses with pink warm extremities  Left knee reveals well-healed portals with 1+ effusion.  He can fully extend and flex to 120 degrees.  Intermittent clicking and crepitus is palpable through his arc of motion in the seated position.  Medial joint line is trace tender more so than lateral with a negative Joyce's.  Knee remained stable.  Popliteal space is benign.  Neurovascular status is intact on sensory, motor and perfusion assessment distally.    MRI KNEE, LEFT (FCV=[id number])    Result Date: 5/21/2024  CONCLUSION:  1. Moderate to severe osteoarthritis of the medial femoral tibial compartment with increasing degenerative tearing of the medial meniscus as described above. 2. Moderate osteoarthritis of the patellofemoral joint. 3. Moderate joint effusion.  No intra-articular bodies noted. 4. Mild extensor tendinopathy.   LOCATION:  [facility]          Dictated by (CST): Laurie Bauer DO on 5/21/2024 at 4:25 PM     Finalized by (CST): Laurie Bauer DO on 5/21/2024 at 4:30 PM            Assessment/Diagnoses:  There are no diagnoses linked to this encounter.    Plan:  I reviewed imaging and exam findings with the patient.  Primary findings confirm osteoarthritic changes of the medial and patellofemoral compartments.  Degenerative signal in the medial meniscus is consistent with postoperative changes versus further degeneration of the  remnant meniscus.  Most of his symptoms are localized to the anterior aspect of this left knee at the patellofemoral articulation with fairly high-grade arthritic changes were noted at arthroscopy.  We discussed treatment options which includes continued conservative management, repeat injections versus repeat arthroscopy.  Unfortunately I do not expect a second arthroscopy to result in significant improvement in his pain although there is a chance that it may subdue the severity and recurrent nature of his effusions.  It would simply provide another opportunity to evacuate any degenerative cartilage, meniscal tissue or intra-articular debris which could provide some temporary benefit.  Unfortunately the degenerative process is not affected or slowed by arthroscopy.  For now, he is not interested in proceeding with another arthroscopy but is interested in perhaps drainage and injection prior to an upcoming family vacation in July.  He understands the nature and risks and we will therefore schedule a follow-up in a few weeks prior to his vacation hopeful for symptomatic relief and good tolerance.  All questions were answered and he verbalized understanding appreciation.  Activity protection, light compression, icing and over-the-counter topical and oral anti-inflammatories may be continued for symptomatic relief.  Follow-up prior to his plans for travel for possible aspiration and injection.  Questions were answered and he verbalized understanding and appreciation.      Erica Coto MD  [facility]      This document was partially prepared using Dragon Medical voice recognition software.

## 2024-07-03 ENCOUNTER — OFFICE VISIT (OUTPATIENT)
Dept: ORTHOPEDICS CLINIC | Facility: CLINIC | Age: 64
End: 2024-07-03
Payer: COMMERCIAL

## 2024-07-03 VITALS — BODY MASS INDEX: 35.29 KG/M2 | WEIGHT: 275 LBS | HEIGHT: 74 IN

## 2024-07-03 DIAGNOSIS — M17.12 PRIMARY OSTEOARTHRITIS OF LEFT KNEE: Primary | ICD-10-CM

## 2024-07-03 DIAGNOSIS — M25.462 EFFUSION OF LEFT KNEE: ICD-10-CM

## 2024-07-03 PROCEDURE — 20610 DRAIN/INJ JOINT/BURSA W/O US: CPT | Performed by: ORTHOPAEDIC SURGERY

## 2024-07-03 RX ORDER — TRIAMCINOLONE ACETONIDE 40 MG/ML
40 INJECTION, SUSPENSION INTRA-ARTICULAR; INTRAMUSCULAR ONCE
Status: COMPLETED | OUTPATIENT
Start: 2024-07-03 | End: 2024-07-03

## 2024-07-03 RX ADMIN — TRIAMCINOLONE ACETONIDE 40 MG: 40 INJECTION, SUSPENSION INTRA-ARTICULAR; INTRAMUSCULAR at 08:59:00

## 2024-07-03 NOTE — PROGRESS NOTES
[facility] Note    Chief Complaint   Patient presents with    Follow - Up     LT KNEE PAIN  -patient is here today for cortisone injection on his left knee     HPI: The patient is a 64 year old male who presents with his wife for assessment of his left knee.  He underwent arthroscopy nearly a year ago and has struggled with chronic effusions and medial sided pain.  Although he is improved with regard to the medial aspect of this knee, he continues to struggle with recurrent stiffness, swelling and intermittent subjective instability.  Significant patellofemoral arthritic changes were seen at the time of surgery and he has responded to some degree to postoperative aspiration of his knee effusions.  Repeat MRI did not show any new internal derangements warranting repeat arthroscopy.  He is scheduled to visit [place] with his family and inquires about repeat aspiration and injection today hopeful to navigate [place] with his grandchildren.    Past Medical History:    Atypical mole    Calculus of kidney    Hearing loss    Hemorrhoids    High blood pressure    KIDNEY STONE    Visual impairment    Wears glasses     Past Surgical History:   Procedure Laterality Date    Fragmenting of kidney stone  01/05/2010    Performed by LEXI MONTANEZ [place] [facility] [place], [facility]    Other surgical history  01/05/2010    R [facility] Cysto- Dr. Montanez    Other surgical history Left 10/17/2023    Left knee scope     Current Outpatient Medications   Medication Sig Dispense Refill    Multiple Vitamin (MULTI-VITAMIN DAILY) Oral Tab Take by mouth.      EQL GLUCOSAMINE CHONDROITIN OR Take 1,200 mg by mouth daily.      lisinopril 10 MG Oral Tab Take 1 tablet (10 mg total) by mouth daily.      rosuvastatin 5 MG Oral Tab Take 1 tablet (5 mg total) by mouth daily.      aspirin 81 MG Oral Tab EC Take 1 tablet (81 mg total) by mouth daily.      NORVASC 10 MG OR TABS Take by mouth.       No Known Allergies  Family History   Problem Relation Age  of Onset    Hypertension Mother      Social History     Occupational History    Not on file   Tobacco Use    Smoking status: Never    Smokeless tobacco: Never   Vaping Use    Vaping status: Never Used   Substance and Sexual Activity    Alcohol use: Yes     Alcohol/week: 4.0 standard drinks of alcohol     Types: 2 Glasses of wine, 2 Standard drinks or equivalent per week    Drug use: Never    Sexual activity: Not on file        ROS:  Complete ROS reviewed by me and non-contributory to the chief complaint except as mentioned above.    Physical Exam:    Ht 6' 2\" (1.88 m)   Wt 275 lb (124.7 kg)   BMI 35.31 kg/m²   Left knee reveals trace effusion.  He can fully extend and flex to 120 degrees.  Intermittent clicking and crepitus is palpable through his arc of motion in the seated position.  Medial joint line is trace tender more so than lateral with a negative Joyce's.  Knee remained stable.  Popliteal space is benign.  Neurovascular status is intact on sensory, motor and perfusion assessment distally.    MRI KNEE, LEFT (OSQ=[id number])     Result Date: 5/21/2024  CONCLUSION:  1. Moderate to severe osteoarthritis of the medial femoral tibial compartment with increasing degenerative tearing of the medial meniscus as described above. 2. Moderate osteoarthritis of the patellofemoral joint. 3. Moderate joint effusion.  No intra-articular bodies noted. 4. Mild extensor tendinopathy.   LOCATION:  [facility]          Dictated by (CST): Laurie Bauer DO on 5/21/2024 at 4:25 PM     Finalized by (CST): Laurie Bauer DO on 5/21/2024 at 4:30 PM         Assessment/Diagnoses:  Diagnoses and all orders for this visit:    Primary osteoarthritis of left knee  -     DRAIN/INJECT LARGE JOINT/BURSA  -     triamcinolone acetonide (Kenalog-40) 40 MG/ML injection 40 mg    Effusion of left knee  -     DRAIN/INJECT LARGE JOINT/BURSA  -     triamcinolone acetonide (Kenalog-40) 40 MG/ML injection 40 mg        Plan:  I reviewed previous imaging and  exam findings with the patient.  Primary findings confirm osteoarthritic changes of the medial and patellofemoral compartments.  Degenerative signal in the medial meniscus is consistent with postoperative changes versus further degeneration of the remnant meniscus.  Most of his symptoms are localized to the anterior aspect of this left knee at the patellofemoral articulation with fairly high-grade arthritic changes were noted at arthroscopy.  We discussed treatment options which includes continued conservative management, repeat injections versus repeat arthroscopy.  Unfortunately I do not expect a second arthroscopy to result in significant improvement in his pain although there is a chance that it may subdue the severity and recurrent nature of his effusions. He is not interested in proceeding with another arthroscopy but would like to try aspiration injection today in preparation for his trip to [place].  He understands the nature and risks and gives informed consent.  All questions were answered and he verbalized understanding appreciation.  We also presented information regarding a medial  brace for his consideration.  The importance of weight loss was also emphasized as he may become a candidate for total knee replacement at some point.  Referral to the [facility] weight loss clinic was also provided.  Follow-up with us as needed.    Knee Aspiration Injection Procedure:  After discussing risk, benefits and alternatives to knee aspiration with injection including but not limited to needle infection, steroid flare or failed aspiration/improvement, the patient gave written and verbal consent to proceed.  Using meticulous sterile technique I injected 4 cc of 1% Xylocaine at the lateral patellofemoral joint of the left knee for anesthesia.  After repeat sterile prep, I used an 18-gauge needle to aspirate 20 cc of clear straw-colored fluid.  This was followed by injection of 40 mg of Kenalog mixed with 4 cc  of 1% Xylocaine through the same needle to minimal resistance.  Band-Aid was applied followed by application of a compressive 6 inch Ace wrap.  Instructions were given to contact us if the patient has any adverse reactions.  Patient tolerated the aspiration injection well with no evidence of complications.      Erica Coto MD  [facility]      This document was partially prepared using Dragon Medical voice recognition software.

## 2024-10-30 ENCOUNTER — OFFICE VISIT (OUTPATIENT)
Dept: ORTHOPEDICS CLINIC | Facility: CLINIC | Age: 64
End: 2024-10-30
Payer: COMMERCIAL

## 2024-10-30 VITALS — HEIGHT: 74 IN | WEIGHT: 275 LBS | BODY MASS INDEX: 35.29 KG/M2

## 2024-10-30 DIAGNOSIS — M25.462 EFFUSION OF LEFT KNEE: Primary | ICD-10-CM

## 2024-10-30 DIAGNOSIS — M17.12 PRIMARY OSTEOARTHRITIS OF LEFT KNEE: ICD-10-CM

## 2024-10-30 PROCEDURE — 20610 DRAIN/INJ JOINT/BURSA W/O US: CPT | Performed by: ORTHOPAEDIC SURGERY

## 2024-10-30 PROCEDURE — 99214 OFFICE O/P EST MOD 30 MIN: CPT | Performed by: ORTHOPAEDIC SURGERY

## 2024-10-30 RX ORDER — TRIAMCINOLONE ACETONIDE 40 MG/ML
40 INJECTION, SUSPENSION INTRA-ARTICULAR; INTRAMUSCULAR ONCE
Status: COMPLETED | OUTPATIENT
Start: 2024-10-30 | End: 2024-10-30

## 2024-10-30 RX ADMIN — TRIAMCINOLONE ACETONIDE 40 MG: 40 INJECTION, SUSPENSION INTRA-ARTICULAR; INTRAMUSCULAR at 08:40:00

## 2024-10-30 NOTE — PROGRESS NOTES
[facility] Note    Chief Complaint   Patient presents with    Knee Pain     LEFT KNEE  -Knee still swells  -Wears compression sleeve at all times  -Gets clicking in the knee     HPI: The patient is a 64 year old male who presents with his wife for assessment of his left knee.  He underwent arthroscopy in October of 2023 and has struggled with chronic effusions, medial sided pain and stiffness with activities.  He has been briefly responsive to knee aspirations with injections most recently about 4 months ago.  He and his wife return today with recurrent stiffness, intermittent mild swelling and limited range of motion without history of recent injury.  He has continued to work on weight loss and modified his activities with [place] time [place] this [place].  Repeat MRI did not show any new internal derangements warranting repeat arthroscopy.  He is planning to vacation to [place] with his wife in the next week or 2.  Compression sleeve and Aleve provide some symptomatic relief and support.    Past Medical History:    Atypical mole    Calculus of kidney    Hearing loss    Hemorrhoids    High blood pressure    KIDNEY STONE    Visual impairment    Wears glasses     Past Surgical History:   Procedure Laterality Date    Fragmenting of kidney stone  01/05/2010    Performed by LEXI MONTANEZ at [facility] [place], [facility]    Other surgical history  01/05/2010    R [facility]SWL Cysto- Dr. Montanez    Other surgical history Left 10/17/2023    Left knee scope     Current Outpatient Medications   Medication Sig Dispense Refill    Multiple Vitamin (MULTI-VITAMIN DAILY) Oral Tab Take by mouth.      EQL GLUCOSAMINE CHONDROITIN OR Take 1,200 mg by mouth daily.      lisinopril 10 MG Oral Tab Take 1 tablet (10 mg total) by mouth daily.      rosuvastatin 5 MG Oral Tab Take 1 tablet (5 mg total) by mouth daily.      aspirin 81 MG Oral Tab EC Take 1 tablet (81 mg total) by mouth daily.      NORVASC 10 MG OR TABS Take by  mouth.       No Known Allergies  Family History   Problem Relation Age of Onset    Hypertension Mother      Social History     Occupational History    Not on file   Tobacco Use    Smoking status: Never    Smokeless tobacco: Never   Vaping Use    Vaping status: Never Used   Substance and Sexual Activity    Alcohol use: Yes     Alcohol/week: 4.0 standard drinks of alcohol     Types: 2 Glasses of wine, 2 Standard drinks or equivalent per week    Drug use: Never    Sexual activity: Not on file        ROS:  Complete ROS reviewed by me and non-contributory to the chief complaint except as mentioned above.    Physical Exam:    Ht 6' 2\" (1.88 m)   Wt 275 lb (124.7 kg)   BMI 35.31 kg/m²   Left knee reveals trace effusion.  He can fully extend and flex to 120 degrees, although this compares to trace hyperextension on the right.  Intermittent clicking and crepitus is palpable through his arc of motion in the seated position.  Medial joint line is none tender with a negative Joyce's.  Knee remained stable.  Popliteal space is benign.  Neurovascular status is intact on sensory, motor and perfusion assessment distally.    MRI KNEE, LEFT (RCJ=[id number])     Result Date: 5/21/2024  CONCLUSION:  1. Moderate to severe osteoarthritis of the medial femoral tibial compartment with increasing degenerative tearing of the medial meniscus as described above. 2. Moderate osteoarthritis of the patellofemoral joint. 3. Moderate joint effusion.  No intra-articular bodies noted. 4. Mild extensor tendinopathy.   LOCATION:  [facility]          Dictated by (CST): Laurie Bauer DO on 5/21/2024 at 4:25 PM     Finalized by (CST): Laurie Bauer DO on 5/21/2024 at 4:30 PM         Assessment/Diagnoses:  Diagnoses and all orders for this visit:    Effusion of left knee  -     DRAIN/INJECT LARGE JOINT/BURSA  -     triamcinolone acetonide (Kenalog-40) 40 MG/ML injection 40 mg    Primary osteoarthritis of left knee  -     DRAIN/INJECT LARGE JOINT/BURSA  -      triamcinolone acetonide (Kenalog-40) 40 MG/ML injection 40 mg          Plan:  I reviewed previous imaging and exam findings with the patient.  Although his x-rays from a year ago do not show late arthritic changes postoperative MRI confirms more significant osteoarthritic changes of the medial and patellofemoral compartments.  Degenerative signal in the medial meniscus is consistent with postoperative changes versus further degeneration of the remnant meniscus.  Most of his symptoms are localized to the anterior aspect of this left knee at the patellofemoral articulation where fairly high-grade arthritic changes were noted at arthroscopy.  We discussed treatment options which includes continued conservative management, repeat injections versus repeat arthroscopy.  Unfortunately I do not expect a second arthroscopy to result in significant improvement in his pain although there is a chance that it may subdue the severity and recurrent nature of his effusions. He is not interested in proceeding with another arthroscopy but would like to try repeat aspiration injection today in preparation for his trip to [place].  He understands the nature and risks and gives informed consent.  All questions were answered and he verbalized understanding appreciation.  The importance of weight loss, partial weightbearing exercise such as swimming and cycling was also emphasized.  I also provided a informational handout about [employer] supplement options if we can control his synovitis and effusions.    Knee Aspiration Injection Procedure:  After discussing risk, benefits and alternatives to knee aspiration with injection including but not limited to needle infection, steroid flare or failed aspiration/improvement, the patient gave written and verbal consent to proceed.  Using meticulous sterile technique I injected 4 cc of 1% Xylocaine at the lateral patellofemoral joint of the left knee for anesthesia.  After repeat sterile prep, I used an  18-gauge needle to aspirate 20 cc of clear straw-colored fluid.  This was followed by injection of 40 mg of Kenalog mixed with 4 cc of 1% Xylocaine through the same needle to minimal resistance.  Band-Aid was applied followed by application of a compressive 6 inch Ace wrap.  Instructions were given to contact us if the patient has any adverse reactions.  Patient tolerated the aspiration injection well with no evidence of complications.      Erica Coto MD, FAAOS  Orthopaedic Surgery   Sports Medicine/Knee and Shoulder  [facility]  t: [phone]  f: [phone]           This document was partially prepared using Dragon Medical voice recognition software.

## 2025-08-06 ENCOUNTER — TELEPHONE (OUTPATIENT)
Dept: ORTHOPEDICS CLINIC | Facility: CLINIC | Age: 65
End: 2025-08-06

## 2025-08-06 DIAGNOSIS — M25.562 LEFT KNEE PAIN, UNSPECIFIED CHRONICITY: Primary | ICD-10-CM

## 2025-08-06 DIAGNOSIS — M25.561 RIGHT KNEE PAIN, UNSPECIFIED CHRONICITY: ICD-10-CM

## 2025-08-07 ENCOUNTER — OFFICE VISIT (OUTPATIENT)
Dept: ORTHOPEDICS CLINIC | Facility: CLINIC | Age: 65
End: 2025-08-07

## 2025-08-07 ENCOUNTER — HOSPITAL ENCOUNTER (OUTPATIENT)
Dept: GENERAL RADIOLOGY | Age: 65
Discharge: HOME | End: 2025-08-07
Attending: ORTHOPAEDIC SURGERY

## 2025-08-07 VITALS — BODY MASS INDEX: 37.51 KG/M2 | WEIGHT: 283 LBS | HEIGHT: 73 IN

## 2025-08-07 DIAGNOSIS — M25.561 RIGHT KNEE PAIN, UNSPECIFIED CHRONICITY: ICD-10-CM

## 2025-08-07 DIAGNOSIS — M17.0 PRIMARY OSTEOARTHRITIS OF BOTH KNEES: Primary | ICD-10-CM

## 2025-08-07 DIAGNOSIS — M25.562 LEFT KNEE PAIN, UNSPECIFIED CHRONICITY: ICD-10-CM

## 2025-08-07 PROCEDURE — 73564 X-RAY EXAM KNEE 4 OR MORE: CPT | Performed by: ORTHOPAEDIC SURGERY

## 2025-08-07 PROCEDURE — 3008F BODY MASS INDEX DOCD: CPT | Performed by: ORTHOPAEDIC SURGERY

## 2025-08-07 PROCEDURE — 99214 OFFICE O/P EST MOD 30 MIN: CPT | Performed by: ORTHOPAEDIC SURGERY

## (undated) DEVICE — SHEET,DRAPE,70X100,STERILE: Brand: MEDLINE

## (undated) DEVICE — [AGGRESSIVE PLUS CUTTER, ARTHROSCOPIC SHAVER BLADE,  DO NOT RESTERILIZE,  DO NOT USE IF PACKAGE IS DAMAGED,  KEEP DRY,  KEEP AWAY FROM SUNLIGHT]: Brand: FORMULA

## (undated) DEVICE — SLEEVE COMPR M KNEE LEN SGL USE KENDALL SCD

## (undated) DEVICE — STERILE POLYISOPRENE POWDER-FREE SURGICAL GLOVES WITH EMOLLIENT COATING: Brand: PROTEXIS

## (undated) DEVICE — 10K/24K ARTHROSCOPY INFLOW TUBE SET: Brand: 10K/24K

## (undated) DEVICE — STERILE POLYISOPRENE POWDER-FREE SURGICAL GLOVES: Brand: PROTEXIS

## (undated) DEVICE — SUTURE ETHILON 3-0 PS2 1669H

## (undated) DEVICE — COVER,MAYO STAND,STERILE: Brand: MEDLINE

## (undated) DEVICE — DRESSING PETRO W3XL3IN OIL EMUL N ADH GZ KNIT

## (undated) DEVICE — LACTATED. R IRRIG

## (undated) DEVICE — GOWN SURG AERO CHROME XXL

## (undated) DEVICE — DISPOSABLE TOURNIQUET CUFF SINGLE BLADDER, DUAL PORT AND QUICK CONNECT CONNECTOR: Brand: COLOR CUFF

## (undated) DEVICE — APPLICATOR SKIN PREP 26ML HI LT ORNG 2% CHG

## (undated) DEVICE — GAUZE,SPONGE,FLUFF,6"X6.75",STRL,5/TRAY: Brand: MEDLINE

## (undated) DEVICE — PADDING CAST W4INXL4YD 100% COT SFT SELF BOND

## (undated) DEVICE — KNEE ARTHROSCOPY CDS: Brand: MEDLINE INDUSTRIES, INC.

## (undated) NOTE — LETTER
OUTSIDE TESTING RESULT REQUEST     IMPORTANT: FOR YOUR IMMEDIATE ATTENTION  Please FAX all test results listed below to: [phone]     Testing already done on or about: 10/2/2023 11:00     * * * * If testing is NOT complete, arrange with patient A.S.A.P. * * * *      Patient Name: Kristi Winston  Surgery Date: 10/17/2023  Medical Record: [id number]  CSN: [id number]  : 6/3/1960 - A: 58 y     Sex: male  Surgeon(s):  Sierra Lacey MD  Procedure: LEFT KNEE ARTHROSCOPY, PARTIAL MEDIAL MENISCECTOMY AND CHONDROPLASTY.   Anesthesia Type: General     Surgeon: Sierra Lacey MD     The following Testing and Time Line are REQUIRED PER ANESTHESIA     EKG READ AND SIGNED WITHIN   90 days  BMP (requires 4 hour fast) within  90 days      Thank You,   Sent by:Ermelinda Woodard.

## (undated) NOTE — LETTER
23  [facility] Orthopedic Surgery   Pre-Operative Clearance Request    Patient Name:   Oralia Quezada             :   6/3/1960    Surgeon: Dr. Froilan Mulligan             Date of Surgery: 10/17/23    Surgical Procedure: LEFT KNEE ARTHROSCOPY, PARTIAL MEDIAL MENISCECTOMY AND CHONDROPLASTY. Please complete all of the following 2-3 weeks PRIOR TO your scheduled surgery to avoid potential cancellation. [x]  History and Physical        [x]  Medical  Clearance                     Required pre-op testing to be ordered:                            **Please fax test results, H&P, and clearance to [phone] and to [facility] at [phone]**